# Patient Record
(demographics unavailable — no encounter records)

---

## 2025-03-31 NOTE — HISTORY OF PRESENT ILLNESS
[FreeTextEntry1] : RFR: Cirrhosis Referring from PCP. Rashad Hamm   Marta Loomis is a 58y female who is referred by PCP for liver cirrhosis. PT alos has HLD, Hypothyroidism& thyroid cancer s/p total thyroidectomy, anxiety, depression, Right bundle branch block.  - Pt went to [facility] ED for palpitation in 3/2025 and currently undergoing cardiology evaluation.  - Pt was told pt has liver cirrhosis iin around 7/2024.  Pt has been on ursodiol 300mg 1T BID since 3/2024. + IQIRVO (elafibranor) since 1/2025. Today she reports pruritus/dry eye. Otherwise wnl.   [Medical Hx] as above [Surgical Hx] BACK SURGERY 2008 and 2010 CERVICAL FUSION HYSTERECTOMY LUMBAR FUSION 2008 NECK SURGERY SHOULDER SURGERY Right thyroidectomy 1999 [Social Hx] Alcohol:  Denies              Tobacco: Never     illicit drug: Denies                        Herb and dietary Supplement: Denies              [FMH of liver disease]  None  [Medications] LEvothroxine Elafibranor Ursodiol 300mg 1T BID   [Labs] Lab Collection Time 12/27/24 0514 12/26/24 2041 MCH 30.9 31.2 MCHC 34.2 33.8 MCV 91 92 MPV 10.3 10.4  Recent Labs Lab Collection Time 12/27/24 0514 12/26/24 2041  142 K 3.7 4.4 * 106 CO2 28 30 BUN 9 12 CREATININE 0.64 0.81 GLU 88 95 CALCIUM 8.4* 9.0 PROT -- 7.6 ALBUMIN -- 3.8 ALKALPHOS -- 133* SGOTAST -- 15 SGPTALT -- 38 TOTALBILIRUB -- 0.3 PHOS -- 3.7 MG -- 2.3   [Imaging] US abd 2019: Liver: Hepatic steatosis. Nonspecific mild gallbladder wall thickening. No sonographic evidence of  cholelithiasis. Consider further evaluation with nuclear medicine HIDA  scan and/or contrast-enhanced MRI.   [Procedures] EGD 2021: wnl  Colonoscopy 2021: 2 polypectomy

## 2025-03-31 NOTE — ASSESSMENT
[FreeTextEntry1] :  [Assessment]  58y female who is referred by PCP for liver cirrhosis. PT also has HLD, Hypothyroidism& thyroid cancer s/p total thyroidectomy, anxiety, depression, Right bundle branch block.  - Pt was told pt has liver cirrhosis in around 7/2024. Pt has been on ursodiol 300mg 1T BID since 3/2024. + IQIRVO (elafibranor) since 1/2025.  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 - Discussed at length with the patient that next course of action would depend on results  Labs today  Continue all meds for now imaging: US abd to screen HCC Fibroscan + research in 2 weeks RTO in 2 weeks  ---------------------------- [Addendum on 3/31/25] Pt said pt had liver biopsy 11/2024.  will get the report.

## 2025-03-31 NOTE — PHYSICAL EXAM
[Scleral Icterus] : No Scleral Icterus [Spider Angioma] : No spider angioma(s) were observed [Abdominal  Ascites] : no ascites [Splenomegaly] : no splenomegaly [Non-Tender] : non-tender [Asterixis] : no asterixis observed [Jaundice] : No jaundice [Palmar Erythema] : no Palmar Erythema [General Appearance - Alert] : alert [General Appearance - In No Acute Distress] : in no acute distress [Sclera] : the sclera and conjunctiva were normal [Abnormal Walk] : normal gait [Skin Color & Pigmentation] : normal skin color and pigmentation [Oriented To Time, Place, And Person] : oriented to person, place, and time [Impaired Insight] : insight and judgment were intact

## 2025-03-31 NOTE — REVIEW OF SYSTEMS
[Fever] : no fever [Chills] : no chills [Feeling Tired] : not feeling tired [Eye Pain] : no eye pain [Dry Eyes] : dryness of the eyes [Earache] : no earache [Nosebleeds] : no nosebleeds [Chest Pain] : no chest pain [Palpitations] : no palpitations [Shortness Of Breath] : no shortness of breath [Cough] : no cough [Abdominal Pain] : no abdominal pain [Vomiting] : no vomiting [Constipation] : no constipation [Diarrhea] : no diarrhea [Melena] : no melena [Limb Swelling] : no limb swelling [Itching] : itching [Confused] : no confusion [Dizziness] : no dizziness [Fainting] : no fainting [Anxiety] : no anxiety [Easy Bleeding] : no tendency for easy bleeding [Easy Bruising] : no tendency for easy bruising

## 2025-03-31 NOTE — HISTORY OF PRESENT ILLNESS
[FreeTextEntry1] : RFR: Cirrhosis Referring from PCP. Rashad Hamm   Marta Loomis is a 58y female who is referred by PCP for liver cirrhosis. PT alos has HLD, Hypothyroidism& thyroid cancer s/p total thyroidectomy, anxiety, depression, Right bundle branch block.  - Pt went to [facility] ED for palpitation in 3/2025 and currently undergoing cardiology evaluation.  - Pt was told pt has liver cirrhosis iin around 7/2024.  Pt has been on ursodiol 300mg 1T BID since 3/2024. + IQIRVO (elafibranor) since 1/2025. Today she reports pruritus/dry eye. Otherwise wnl.   [Medical Hx] as above [Surgical Hx] BACK SURGERY 2008 and 2010 CERVICAL FUSION HYSTERECTOMY LUMBAR FUSION 2008 NECK SURGERY SHOULDER SURGERY Right thyroidectomy 1999 [Social Hx] Alcohol:  Denies              Tobacco: Never     illicit drug: Denies                        Herb and dietary Supplement: Denies              [FMH of liver disease]  None  [Medications] LEvothroxine Elafibranor Ursodiol 300mg 1T BID   [Labs] Lab Collection Time 12/27/24 0514 12/26/24 2041 MCH 30.9 31.2 MCHC 34.2 33.8 MCV 91 92 MPV 10.3 10.4  Recent Labs Lab Collection Time 12/27/24 0514 12/26/24 2041  142 K 3.7 4.4 * 106 CO2 28 30 BUN 9 12 CREATININE 0.64 0.81 GLU 88 95 CALCIUM 8.4* 9.0 PROT -- 7.6 ALBUMIN -- 3.8 ALKALPHOS -- 133* SGOTAST -- 15 SGPTALT -- 38 TOTALBILIRUB -- 0.3 PHOS -- 3.7 MG -- 2.3   [Imaging] US abd 2019: Liver: Hepatic steatosis. Nonspecific mild gallbladder wall thickening. No sonographic evidence of  cholelithiasis. Consider further evaluation with nuclear medicine HIDA  scan and/or contrast-enhanced MRI.   [Procedures] EGD 2021: wnl  Colonoscopy 2021: 2 polypectomy

## 2025-04-22 NOTE — HISTORY OF PRESENT ILLNESS
[FreeTextEntry1] : Referring from PCP. Rashad Hamm   Marta Loomis is a 58y female with PBC (AMA+ ALP+) cirrhosis, likely biopsy proven (report pending). PT also has HLD, Hypothyroidism& thyroid cancer s/p total thyroidectomy, anxiety, depression, Right bundle branch block.  - Pt went to [facility] ED for palpitation in 3/2025 and currently undergoing cardiology evaluation.  - Pt was told pt has liver cirrhosis iin around 7/2024.  Pt has been on ursodiol 300mg 1T BID since 3/2024. + IQIRVO (elafibranor) since 1/2025. Fibroscan 4/21/25:  8kPa  Today she reports pruritus/dry eye, headache and abd pain  3/31/2025 AST 22 ALT 19  TB 0.4 plt 311 HAV immune  HBsAb -    [Medical Hx] as above [Surgical Hx] BACK SURGERY 2008 and 2010 CERVICAL FUSION HYSTERECTOMY LUMBAR FUSION 2008 NECK SURGERY SHOULDER SURGERY Right thyroidectomy 1999 [Social Hx] Alcohol:  Denies              Tobacco: Never     illicit drug: Denies                        Herb and dietary Supplement: Denies              [FMH of liver disease]  None  [Medications] LEvothroxine Elafibranor Ursodiol 300mg 1T BID   [Labs] 3/31/25 A1C 5 AFP 2.2 CBC wnl.  CMP wnl AST/ALT 22/19  GGT 9 INR 0.96 IGG/IGM wnl Lab   Collection Time 12/27/24 0514 12/26/24 2041 MCH 30.9 31.2 MCHC 34.2 33.8 MCV 91 92 MPV 10.3 10.4  Recent Labs Lab Collection Time 12/27/24 0514 12/26/24 2041  142 K 3.7 4.4 * 106 CO2 28 30 BUN 9 12 CREATININE 0.64 0.81 GLU 88 95 CALCIUM 8.4* 9.0 PROT -- 7.6 ALBUMIN -- 3.8 ALKALPHOS -- 133* SGOTAST -- 15 SGPTALT -- 38 TOTALBILIRUB -- 0.3 PHOS -- 3.7 MG -- 2.3   [Imaging] US abd 2019: Liver: Hepatic steatosis. Nonspecific mild gallbladder wall thickening. No sonographic evidence of  cholelithiasis. Consider further evaluation with nuclear medicine HIDA  scan and/or contrast-enhanced MRI.   [Procedures] EGD 2021: wnl  Colonoscopy 2021: 2 polypectomy

## 2025-04-22 NOTE — PHYSICAL EXAM
[Non-Tender] : non-tender [General Appearance - Alert] : alert [General Appearance - In No Acute Distress] : in no acute distress [Sclera] : the sclera and conjunctiva were normal [Abnormal Walk] : normal gait [Skin Color & Pigmentation] : normal skin color and pigmentation [Oriented To Time, Place, And Person] : oriented to person, place, and time [Impaired Insight] : insight and judgment were intact [Scleral Icterus] : No Scleral Icterus [Spider Angioma] : No spider angioma(s) were observed [Abdominal  Ascites] : no ascites [Splenomegaly] : no splenomegaly [Asterixis] : no asterixis observed [Jaundice] : No jaundice [Palmar Erythema] : no Palmar Erythema

## 2025-04-22 NOTE — REVIEW OF SYSTEMS
[Dry Eyes] : dryness of the eyes [Itching] : itching [Fever] : no fever [Chills] : no chills [Feeling Tired] : not feeling tired [Eye Pain] : no eye pain [Earache] : no earache [Nosebleeds] : no nosebleeds [Chest Pain] : no chest pain [Palpitations] : no palpitations [Shortness Of Breath] : no shortness of breath [Cough] : no cough [Abdominal Pain] : no abdominal pain [Vomiting] : no vomiting [Constipation] : no constipation [Diarrhea] : no diarrhea [Melena] : no melena [Limb Swelling] : no limb swelling [Confused] : no confusion [Dizziness] : no dizziness [Fainting] : no fainting [Anxiety] : no anxiety [Easy Bleeding] : no tendency for easy bleeding [Easy Bruising] : no tendency for easy bruising

## 2025-04-22 NOTE — ASSESSMENT
[FreeTextEntry1] : [Assessment]  58y female who is referred by PCP for liver cirrhosis likely secondary to PBC (liver bx) currently on rsodiol 300mg 1T BID since 3/2024. + IQIRVO (elafibranor) since 1/2025.  Pt said pt had liver biopsy 11/2024.  education and counseling are done - Discussed at length with the patient that next course of action would depend on results  # HCM  Immune to Hep A HBsAb<3.3 HBsAg- HBcore total Ab - HCV total Ab NR  DEXA scan via PCP's office Recommend HBV vaccination series   [Med modification] due to abd pain, we will hold IQIRVO from today 4/21/25 Continue ursodiol Pt said pt had liver biopsy 11/2024.  Pt will get the Bx report. (We tried but failed) MRI abd in a month labs before next appt RTO in a month after MRI

## 2025-05-19 NOTE — ASSESSMENT
[FreeTextEntry1] : [Assessment]  58y female with PBC (AMA+ ALP+) cirrhosis, likely biopsy proven 10/29/24 (report is not available)  [Plan]  # PBC # Liver briging fibrosis: 8 kPa on fibroscan  Meds for PBC: currently on Ursodiol 300mg 1T BID since 3/2024.  due to abd pain, we will hold IQIRVO (elafibranor) from 4/21/25  Pt said pt had liver biopsy 10/29/24 . education and counseling are done - Discussed at length with the patient that next course of action would depend on results  # HCM Immune to Hep A HBsAb<3.3 HBsAg- HBcore total Ab - HCV total Ab NR  DEXA scan via PCP's office Recommend HBV vaccination series  Continue ursodiol Continue to Hold  IQIRVO (elafibranor)  paul watch   from 101 Pt said pt had liver biopsy 11/2024. Pt will get the Bx report. (We tried but failed)  Labs in 3 months. If her ALP doesn't improve, we will consider LIVDELZI (seladelpar).  US abd in 6 months (11/2025) to screen HCC RTO in 3 months

## 2025-05-19 NOTE — HISTORY OF PRESENT ILLNESS
[FreeTextEntry1] : Referring from PCP. Rashad Hamm  Marta Loomis is a 58y female with PBC (AMA+ ALP+) cirrhosis, likely biopsy proven 10/29/24 (report is not available). PT also has HLD, Hypothyroidism& thyroid cancer s/p total thyroidectomy, anxiety, depression, Right bundle branch block.  - Pt went to [facility] ED for palpitation in 3/2025 and currently undergoing cardiology evaluation. - Pt was told pt has liver cirrhosis in around 7/2024. - Fibroscan 4/21/25:  8kPa  [Interim Hx] Pt has been on ursodiol 300mg 1T BID since 3/2024 Due to abd pain, we will hold IQIRVO (elafibranor) from 4/21/25  Today she reports dry eye, headache and intermittent abd pain. pruritus improving.  [Medical Hx] as above [Surgical Hx] BACK SURGERY 2008 and 2010 CERVICAL FUSION HYSTERECTOMY LUMBAR FUSION 2008 NECK SURGERY SHOULDER SURGERY Right thyroidectomy 1999 [Social Hx] Alcohol: Denies  Tobacco: Never illicit drug: Denies   Herb and dietary Supplement: Denies  [FMH of liver disease] None  [Medications] LEvothroxine Elafibranor Ursodiol 300mg 1T BID  [Labs] 5/13/25 CBC wnl AST/ALT 19/17  GGT 10   3/31/25 A1C 5 AFP 2.2 CBC wnl.  CMP wnl AST/ALT 22/19  GGT 9 INR 0.96 IGG/IGM wnl Lab  Collection Time 12/27/24 0514 12/26/24 2041 MCH 30.9 31.2 MCHC 34.2 33.8 MCV 91 92 MPV 10.3 10.4  Recent Labs Lab Collection Time 12/27/24 0514 12/26/24 2041  142 K 3.7 4.4 * 106 CO2 28 30 BUN 9 12 CREATININE 0.64 0.81 GLU 88 95 CALCIUM 8.4* 9.0 PROT -- 7.6 ALBUMIN -- 3.8 ALKALPHOS -- 133* SGOTAST -- 15 SGPTALT -- 38 TOTALBILIRUB -- 0.3 PHOS -- 3.7 MG -- 2.3  [Imaging] MRI abd 5/2/25: Mild fatty liver. No cirrhosis or supicious lesion. Post cholecystectomy, w/o bile duct dilatation or choledocholithiasis.  a tiny 3-4mm arterial phase flow related phenomemon at the right dome.  US abd 2019: Liver: Hepatic steatosis. Nonspecific mild gallbladder wall thickening. No sonographic evidence of cholelithiasis. Consider further evaluation with nuclear medicine HIDA scan and/or contrast-enhanced MRI.  [Procedures] EGD 2021: wnl  Colonoscopy 2021: 2 polypectomy

## 2025-05-19 NOTE — HISTORY OF PRESENT ILLNESS
[FreeTextEntry1] : Referring from PCP. Rashad Hamm  Marta Loomis is a 58y female with PBC (AMA+ ALP+) cirrhosis, likely biopsy proven 10/29/24 (report is not available). PT also has HLD, Hypothyroidism& thyroid cancer s/p total thyroidectomy, anxiety, depression, Right bundle branch block.  - Pt went to [facility] ED for palpitation in 3/2025 and currently undergoing cardiology evaluation. - Pt was told pt has liver cirrhosis in around 7/2024. - Fibroscan 4/21/25:  8kPa  [Interim Hx] Pt has been on ursodiol 300mg 1T BID since 3/2024 Due to abd pain, we will hold IQIRVO (elafibranor) from 4/21/25  Today she reports dry eye, headache and intermittent abd pain. pruritus improving.  [Medical Hx] as above [Surgical Hx] BACK SURGERY 2008 and 2010 CERVICAL FUSION HYSTERECTOMY LUMBAR FUSION 2008 NECK SURGERY SHOULDER SURGERY Right thyroidectomy 1999 [Social Hx] Alcohol: Denies  Tobacco: Never illicit drug: Denies   Herb and dietary Supplement: Denies  [FMH of liver disease] None  [Medications] LEvothroxine Elafibranor Ursodiol 300mg 1T BID  [Labs] 5/13/25 CBC wnl AST/ALT 19/17  GGT 10   3/31/25 A1C 5 AFP 2.2 CBC wnl.  CMP wnl AST/ALT 22/19  GGT 9 INR 0.96 IGG/IGM wnl Lab  Collection Time 12/27/24 0514 12/26/24 2041 MCH 30.9 31.2 MCHC 34.2 33.8 MCV 91 92 MPV 10.3 10.4  Recent Labs Lab Collection Time 12/27/24 0514 12/26/24 2041  142 K 3.7 4.4 * 106 CO2 28 30 BUN 9 12 CREATININE 0.64 0.81 GLU 88 95 CALCIUM 8.4* 9.0 PROT -- 7.6 ALBUMIN -- 3.8 ALKALPHOS -- 133* SGOTAST -- 15 SGPTALT -- 38 TOTALBILIRUB -- 0.3 PHOS -- 3.7 MG -- 2.3  [Imaging] MRI abd 5/2/25: Mild fatty liver. No cirrhosis or supicious lesion. Post cholecystectomy, w/o bile duct dilatation or choledocholithiasis.  a tiny 3-4mm arterial phase flow related phenomemon at the right dome.  US abd 2019: Liver: Hepatic steatosis. Nonspecific mild gallbladder wall thickening. No sonographic evidence of cholelithiasis. Consider further evaluation with nuclear medicine HIDA scan and/or contrast-enhanced MRI.  [Procedures] EGD 2021: wnl  Colonoscopy 2021: 2 polypectomy